# Patient Record
Sex: MALE | Race: ASIAN | NOT HISPANIC OR LATINO | Employment: FULL TIME | URBAN - METROPOLITAN AREA
[De-identification: names, ages, dates, MRNs, and addresses within clinical notes are randomized per-mention and may not be internally consistent; named-entity substitution may affect disease eponyms.]

---

## 2019-09-01 ENCOUNTER — HOSPITAL ENCOUNTER (EMERGENCY)
Facility: MEDICAL CENTER | Age: 59
End: 2019-09-01
Attending: EMERGENCY MEDICINE
Payer: COMMERCIAL

## 2019-09-01 VITALS
SYSTOLIC BLOOD PRESSURE: 135 MMHG | HEART RATE: 80 BPM | OXYGEN SATURATION: 98 % | TEMPERATURE: 98.4 F | DIASTOLIC BLOOD PRESSURE: 83 MMHG | BODY MASS INDEX: 22.89 KG/M2 | WEIGHT: 142.42 LBS | HEIGHT: 66 IN | RESPIRATION RATE: 15 BRPM

## 2019-09-01 DIAGNOSIS — H61.21 IMPACTED CERUMEN OF RIGHT EAR: ICD-10-CM

## 2019-09-01 PROCEDURE — 69210 REMOVE IMPACTED EAR WAX UNI: CPT

## 2019-09-01 PROCEDURE — 99283 EMERGENCY DEPT VISIT LOW MDM: CPT

## 2019-09-01 SDOH — HEALTH STABILITY: MENTAL HEALTH: HOW OFTEN DO YOU HAVE A DRINK CONTAINING ALCOHOL?: NEVER

## 2019-09-01 NOTE — ED NOTES
Chino Anand discharged via ambulatory with steady gait.  Discharge instructions given and reviewed, patient educated to follow up with PCP, verbalized understanding.  Prescriptions given x 1.  All personal belongings in possession.  No questions at this time.

## 2019-09-01 NOTE — ED TRIAGE NOTES
Chief Complaint   Patient presents with   • Hearing Loss     Pt states that he was cleaning his ear with a q-tip when he had a sudden loss of hearing in his right ear.  Pt concerned that he pushed ear wax into his ear causing the hearing loss.  Denies pain.  Triage process explained to patient.  Pt back to waiting room.  Pt instructed to inform RN if any changes or questions arise.

## 2019-09-01 NOTE — ED PROVIDER NOTES
"ED Provider Note    Scribed for Ranjan Mclaughlin M.D. by Ronald Bryan. 9/1/2019, 9:13 AM.    Primary care provider: None noted.  Means of arrival: Walk-in  History obtained from: patient  History limited by: none    CHIEF COMPLAINT  Chief Complaint   Patient presents with   • Hearing Loss       HPI  Chino Anand is a 58 y.o. male who presents to the Emergency Department complaining of right ear hearing loss onset earlier today. The patient reports that he was using a Q-tip to clean his right ear when the right ear hearing loss onset. He is concerned that he may have caused a cerumen impaction and he denies any associated right ear pain. No alleviating or exacerbating factors were identified for the patient's right ear hearing loss. No long-term medical diseases including hypertension or diabetes were identified.    REVIEW OF SYSTEMS  Review of Systems   HENT: Positive for hearing loss (Right ear). Negative for ear pain.        PAST MEDICAL HISTORY  None noted.    SURGICAL HISTORY  patient denies any surgical history    SOCIAL HISTORY  Social History     Tobacco Use   • Smoking status: Never Smoker   • Smokeless tobacco: Never Used   Substance Use Topics   • Alcohol use: Never     Frequency: Never   • Drug use: Never      Social History     Substance and Sexual Activity   Drug Use Never       FAMILY HISTORY  History reviewed. No pertinent family history.    CURRENT MEDICATIONS  None noted    ALLERGIES  No Known Allergies    PHYSICAL EXAM  VITAL SIGNS: /83   Pulse 80   Temp 36.9 °C (98.4 °F) (Temporal)   Resp 15   Ht 1.676 m (5' 6\")   Wt 64.6 kg (142 lb 6.7 oz)   SpO2 98%   BMI 22.99 kg/m²     Constitutional: Alert in no apparent distress.  HENT: Cerumen impaction in the right ear. No cerumen impaction in the left ear. Normocephalic, Bilateral external ears normal. Nose normal.   Eyes: Pupils are equal and reactive. Conjunctiva normal, non-icteric.   Thorax & Lungs: Easy unlabored " respirations  Abdomen:  No gross signs of peritonitis, no pain with movement   Skin: Visualized skin is  Dry, No erythema, No rash.   Extremities:  No edema, No asymmetry  Neurologic: Alert, Grossly non-focal.   Psychiatric: Affect and Mood normal    COURSE & MEDICAL DECISION MAKING  Pertinent Labs & Imaging studies reviewed. (See chart for details)    9:13 AM - Patient seen and examined at bedside. Discussed that the patient should obtain a Water Pik at any local store in order to dislodge the patient's cerumen impaction upon discharge. He is understanding and agreeable to the plan for discharge.        The patient will return for new or worsening symptoms and is stable at the time of discharge.    The patient is referred to a primary physician for blood pressure management, diabetic screening, and for all other preventative health concerns.    DISPOSITION:  Patient will be discharged home in stable condition.    FOLLOW UP:  No follow-up provider specified.    OUTPATIENT MEDICATIONS:  New Prescriptions    No medications on file       FINAL IMPRESSION  1. Impacted cerumen of right ear          Ronald JOHNSON (Tammy), am scribing for, and in the presence of, Ranjan Mclaughlin M.D..    Electronically signed by: Ronald Bryan (Tammy), 9/1/2019    IRanjan M.D. personally performed the services described in this documentation, as scribed by Ronald Bryan in my presence, and it is both accurate and complete.    E    The note accurately reflects work and decisions made by me.  Ranjan Mclaughlin  9/1/2019  2:59 PM